# Patient Record
Sex: FEMALE | Race: OTHER | NOT HISPANIC OR LATINO | URBAN - METROPOLITAN AREA
[De-identification: names, ages, dates, MRNs, and addresses within clinical notes are randomized per-mention and may not be internally consistent; named-entity substitution may affect disease eponyms.]

---

## 2022-03-15 ENCOUNTER — OUTPATIENT (OUTPATIENT)
Dept: OUTPATIENT SERVICES | Facility: HOSPITAL | Age: 27
LOS: 1 days | End: 2022-03-15

## 2022-03-15 VITALS
TEMPERATURE: 99 F | DIASTOLIC BLOOD PRESSURE: 80 MMHG | OXYGEN SATURATION: 99 % | RESPIRATION RATE: 17 BRPM | HEART RATE: 84 BPM | WEIGHT: 179.9 LBS | HEIGHT: 62 IN | SYSTOLIC BLOOD PRESSURE: 112 MMHG

## 2022-03-15 DIAGNOSIS — E03.9 HYPOTHYROIDISM, UNSPECIFIED: ICD-10-CM

## 2022-03-15 DIAGNOSIS — Z98.891 HISTORY OF UTERINE SCAR FROM PREVIOUS SURGERY: ICD-10-CM

## 2022-03-15 DIAGNOSIS — O09.93 SUPERVISION OF HIGH RISK PREGNANCY, UNSPECIFIED, THIRD TRIMESTER: ICD-10-CM

## 2022-03-15 DIAGNOSIS — Z98.891 HISTORY OF UTERINE SCAR FROM PREVIOUS SURGERY: Chronic | ICD-10-CM

## 2022-03-15 LAB
ANION GAP SERPL CALC-SCNC: 15 MMOL/L — HIGH (ref 7–14)
APPEARANCE UR: CLEAR — SIGNIFICANT CHANGE UP
BILIRUB UR-MCNC: NEGATIVE — SIGNIFICANT CHANGE UP
BLD GP AB SCN SERPL QL: NEGATIVE — SIGNIFICANT CHANGE UP
BUN SERPL-MCNC: 6 MG/DL — LOW (ref 7–23)
CALCIUM SERPL-MCNC: 9.7 MG/DL — SIGNIFICANT CHANGE UP (ref 8.4–10.5)
CHLORIDE SERPL-SCNC: 100 MMOL/L — SIGNIFICANT CHANGE UP (ref 98–107)
CO2 SERPL-SCNC: 20 MMOL/L — LOW (ref 22–31)
COLOR SPEC: COLORLESS — SIGNIFICANT CHANGE UP
CREAT SERPL-MCNC: 0.51 MG/DL — SIGNIFICANT CHANGE UP (ref 0.5–1.3)
DIFF PNL FLD: NEGATIVE — SIGNIFICANT CHANGE UP
EGFR: 130 ML/MIN/1.73M2 — SIGNIFICANT CHANGE UP
GLUCOSE SERPL-MCNC: 61 MG/DL — LOW (ref 70–99)
GLUCOSE UR QL: NEGATIVE — SIGNIFICANT CHANGE UP
HCT VFR BLD CALC: 36.4 % — SIGNIFICANT CHANGE UP (ref 34.5–45)
HGB BLD-MCNC: 11.5 G/DL — SIGNIFICANT CHANGE UP (ref 11.5–15.5)
KETONES UR-MCNC: NEGATIVE — SIGNIFICANT CHANGE UP
LEUKOCYTE ESTERASE UR-ACNC: NEGATIVE — SIGNIFICANT CHANGE UP
MCHC RBC-ENTMCNC: 23.1 PG — LOW (ref 27–34)
MCHC RBC-ENTMCNC: 31.6 GM/DL — LOW (ref 32–36)
MCV RBC AUTO: 73.2 FL — LOW (ref 80–100)
NITRITE UR-MCNC: NEGATIVE — SIGNIFICANT CHANGE UP
NRBC # BLD: 0 /100 WBCS — SIGNIFICANT CHANGE UP
NRBC # FLD: 0 K/UL — SIGNIFICANT CHANGE UP
PH UR: 7 — SIGNIFICANT CHANGE UP (ref 5–8)
PLATELET # BLD AUTO: 176 K/UL — SIGNIFICANT CHANGE UP (ref 150–400)
POTASSIUM SERPL-MCNC: 3.8 MMOL/L — SIGNIFICANT CHANGE UP (ref 3.5–5.3)
POTASSIUM SERPL-SCNC: 3.8 MMOL/L — SIGNIFICANT CHANGE UP (ref 3.5–5.3)
PROT UR-MCNC: NEGATIVE — SIGNIFICANT CHANGE UP
RBC # BLD: 4.97 M/UL — SIGNIFICANT CHANGE UP (ref 3.8–5.2)
RBC # FLD: 16.5 % — HIGH (ref 10.3–14.5)
RH IG SCN BLD-IMP: POSITIVE — SIGNIFICANT CHANGE UP
SODIUM SERPL-SCNC: 135 MMOL/L — SIGNIFICANT CHANGE UP (ref 135–145)
SP GR SPEC: 1.01 — SIGNIFICANT CHANGE UP (ref 1–1.05)
UROBILINOGEN FLD QL: SIGNIFICANT CHANGE UP
WBC # BLD: 11.4 K/UL — HIGH (ref 3.8–10.5)
WBC # FLD AUTO: 11.4 K/UL — HIGH (ref 3.8–10.5)

## 2022-03-15 NOTE — OB PST NOTE - NSHPPHYSICALEXAM_GEN_ALL_CORE
pst Constitutional: Well Developed, Well Groomed, Well Nourished, No Distress    Eyes: PERRL, EOMI, conjunctiva clear    Ears: Normal    Mouth & Gums: Normal, moist    Pharynx: No tenderness, discharge, or peritonsillar abscess    Tonsils: No Redness, discharge, tenderness, or swelling    Neck: Supple, no JVD, normal thyroid glands, no carotid bruits, no cervical vertebral or paraspinal tenderness    Breast: Normal shape, no masses, no tenderness, nipples normal, no nipple discharge    Back: Normal shape, ROM intact, strength intact, no vertebral tenderness    Respiratory: Airway patent, breath sounds equal, good air movement, respiration non-labored, clear to auscultation bilateral, no chest wall tenderness, no intercostal retractions, no rales, no wheezes, no rhonchi, no subcutaneous emphysema    Cardiovascular:  Regular rate and rhythm, no rubs or murmur, normal PMI    Gastrointestinal: Soft, non-tender, non distention, no masses palpable, bowel sound normal, no bruit, no rebound tenderness    Extremities: No clubbing, cyanosis, or pedal edema    Vascular:  Carotid Pulse normal , Radial Pulse normal, Femoral Pulse normal, DP pulse normal, PT pulse normal    Neurological: alert & oriented x 3, sensation intact, deep reflexes intact, cranial nerve intact, normal strength    Skin: warm and dry, normal color    Lymph Nodes: normal posterior cervical lymph node, normal anterior cervical lymph node, normal supraclavicular lymph node, normal axillary lymph node, normal inguinal lymph node, normal femoral lymph node    Musculoskeletal: ROM intact, no joint swelling, warmth, or calf tenderness. Normal strength    Psychiatric: normal affect, normal behavior

## 2022-03-15 NOTE — OB PST NOTE - NSHPREVIEWOFSYSTEMS_GEN_ALL_CORE
Statement Selected
General: No fever, chills, sweating, anorexia, weight loss or weight gain. No polyphagia, polyurea, polydypsia, malaise, or fatigue    Skin: No rashes, itching, or dryness. No change in size/color of moles. No tumors, brittle nails, pitted nails, or hair loss    Breast: No tenderness, lumps, or nipple discharge      Ophthalmologic: No diplopia, photophobia, lacrimation, blurred Vision , or eye discharge    ENMT Symptoms: No hearing difficulty, ear pain, tinnitus, or vertigo. No sinus symptoms, nasal congestion, nasal   discharge, or nasal obstruction    Respiratory and Thorax: No wheezing, dyspnea, cough, hemoptysis, or pleuritic chest pPain     Cardiovascular: No chest pain, palpitations, dyspnea on exertion, orthopnea, paroxysmal nocturnal dyspnea,   peripheral edema, or claudication    Gastrointestinal: No nausea, vomiting, diarrhea, constipation, change in bowel habits, flatulence, abdominal pain, or melena    Genitourinary/ Pelvis: No hematuria, renal colic, or flank pain.  No urine discoloration, incontinence, dysuria, or urinary hesitancy. Normal urinary frequency. No nocturia, abnormal vaginal bleeding, vaginal discharge, spotting, pelvic pain, or vaginal leakage    Musculoskeletal: No arthralgia, arthritis, joint swelling, muscle cramping, muscle weakness, neck pain, arm pain, or leg pain    Neurological: No transient paralysis, weakness, paresthesias, or seizures. No syncope, tremors, vertigo, loss of sensation, difficulty walking, loss of consciousness, hemiparesis, confusion, or facial palsy    Psychiatric: No suicidal ideation, depression, anxiety, insomnia, memory loss, paranoia, mood swings, agitation, hallucinations, or hyperactivity    Hematology: No gum bleeding, nose bleeding, or skin lumps    Lymphatic: No enlarged or tender lymph nodes. No extremity swelling    Endocrine: No heat or cold intolerance    Immunologic: No recurrent or persistent infections

## 2022-03-15 NOTE — OB PST NOTE - FALL HARM RISK - UNIVERSAL INTERVENTIONS
Bed in lowest position, wheels locked, appropriate side rails in place/Call bell, personal items and telephone in reach/Instruct patient to call for assistance before getting out of bed or chair/Non-slip footwear when patient is out of bed/[place] to call system/Physically safe environment - no spills, clutter or unnecessary equipment/Purposeful Proactive Rounding/Room/bathroom lighting operational, light cord in reach

## 2022-03-15 NOTE — OB PST NOTE - HISTORY OF PRESENT ILLNESS
year old pregnant female presents to presurgical testing scheduled for   section. Patient denies vaginal  bleeding, spotting or leakage of amniotic fluid. Patient denies regular contractions. Patient reports positive fetal movement.   27 year old pregnant female presents to presurgical testing scheduled for   section. Patient denies vaginal  bleeding, spotting or leakage of amniotic fluid. Patient denies regular contractions. Patient reports positive fetal movement.     Pt with PMH of Hypothyroidism  27 year old pregnant female presents to presurgical testing scheduled for repeat  section. Patient denies vaginal  bleeding, spotting or leakage of amniotic fluid. Patient denies regular contractions. Patient reports positive fetal movement.     Pt with PMH of Hypothyroidism

## 2022-03-15 NOTE — OB PST NOTE - PROBLEM SELECTOR PLAN 1
Pt is tentatively scheduled for repeat  section on22.    Pre-op instructions provided. Pt given verbal and written instructions with teach back on chlorhexidine wash . Pt verbalized understanding with return demonstration.    Pt strongly advised to follow up with surgeon to discuss COVID testing requirements prior to procedure.

## 2022-03-27 ENCOUNTER — TRANSCRIPTION ENCOUNTER (OUTPATIENT)
Age: 27
End: 2022-03-27

## 2022-03-28 ENCOUNTER — INPATIENT (INPATIENT)
Facility: HOSPITAL | Age: 27
LOS: 2 days | Discharge: HOME | End: 2022-03-31
Attending: SPECIALIST | Admitting: SPECIALIST
Payer: MEDICAID

## 2022-03-28 ENCOUNTER — EMERGENCY (EMERGENCY)
Facility: HOSPITAL | Age: 27
LOS: 1 days | Discharge: STILL ADMITTED OR ADMINISTRATIVE | End: 2022-03-28
Admitting: EMERGENCY MEDICINE
Payer: MEDICAID

## 2022-03-28 VITALS
HEIGHT: 62 IN | DIASTOLIC BLOOD PRESSURE: 71 MMHG | OXYGEN SATURATION: 100 % | SYSTOLIC BLOOD PRESSURE: 116 MMHG | RESPIRATION RATE: 18 BRPM | TEMPERATURE: 98 F | HEART RATE: 103 BPM

## 2022-03-28 VITALS
SYSTOLIC BLOOD PRESSURE: 125 MMHG | HEART RATE: 83 BPM | RESPIRATION RATE: 18 BRPM | DIASTOLIC BLOOD PRESSURE: 73 MMHG | TEMPERATURE: 98 F

## 2022-03-28 DIAGNOSIS — Z98.891 HISTORY OF UTERINE SCAR FROM PREVIOUS SURGERY: Chronic | ICD-10-CM

## 2022-03-28 DIAGNOSIS — O26.899 OTHER SPECIFIED PREGNANCY RELATED CONDITIONS, UNSPECIFIED TRIMESTER: ICD-10-CM

## 2022-03-28 DIAGNOSIS — Z3A.00 WEEKS OF GESTATION OF PREGNANCY NOT SPECIFIED: ICD-10-CM

## 2022-03-28 LAB
ANISOCYTOSIS BLD QL: SLIGHT — SIGNIFICANT CHANGE UP
BASOPHILS # BLD AUTO: 0 K/UL — SIGNIFICANT CHANGE UP (ref 0–0.2)
BASOPHILS NFR BLD AUTO: 0 % — SIGNIFICANT CHANGE UP (ref 0–2)
COVID-19 SPIKE DOMAIN AB INTERP: POSITIVE
COVID-19 SPIKE DOMAIN ANTIBODY RESULT: >250 U/ML — HIGH
EOSINOPHIL # BLD AUTO: 0 K/UL — SIGNIFICANT CHANGE UP (ref 0–0.5)
EOSINOPHIL NFR BLD AUTO: 0 % — SIGNIFICANT CHANGE UP (ref 0–6)
GIANT PLATELETS BLD QL SMEAR: PRESENT — SIGNIFICANT CHANGE UP
HCT VFR BLD CALC: 37.7 % — SIGNIFICANT CHANGE UP (ref 34.5–45)
HGB BLD-MCNC: 12 G/DL — SIGNIFICANT CHANGE UP (ref 11.5–15.5)
IANC: 8.04 K/UL — HIGH (ref 1.8–7.4)
LYMPHOCYTES # BLD AUTO: 1.34 K/UL — SIGNIFICANT CHANGE UP (ref 1–3.3)
LYMPHOCYTES # BLD AUTO: 11.5 % — LOW (ref 13–44)
MANUAL SMEAR VERIFICATION: SIGNIFICANT CHANGE UP
MCHC RBC-ENTMCNC: 23.3 PG — LOW (ref 27–34)
MCHC RBC-ENTMCNC: 31.8 GM/DL — LOW (ref 32–36)
MCV RBC AUTO: 73.1 FL — LOW (ref 80–100)
MICROCYTES BLD QL: SLIGHT — SIGNIFICANT CHANGE UP
MONOCYTES # BLD AUTO: 0.93 K/UL — HIGH (ref 0–0.9)
MONOCYTES NFR BLD AUTO: 8 % — SIGNIFICANT CHANGE UP (ref 2–14)
MYELOCYTES NFR BLD: 3.5 % — HIGH (ref 0–0)
NEUTROPHILS # BLD AUTO: 8.76 K/UL — HIGH (ref 1.8–7.4)
NEUTROPHILS NFR BLD AUTO: 72.6 % — SIGNIFICANT CHANGE UP (ref 43–77)
NEUTS BAND # BLD: 2.6 % — SIGNIFICANT CHANGE UP (ref 0–6)
OVALOCYTES BLD QL SMEAR: SLIGHT — SIGNIFICANT CHANGE UP
PLAT MORPH BLD: ABNORMAL
PLATELET # BLD AUTO: 190 K/UL — SIGNIFICANT CHANGE UP (ref 150–400)
PLATELET COUNT - ESTIMATE: NORMAL — SIGNIFICANT CHANGE UP
POIKILOCYTOSIS BLD QL AUTO: SLIGHT — SIGNIFICANT CHANGE UP
POLYCHROMASIA BLD QL SMEAR: SLIGHT — SIGNIFICANT CHANGE UP
RBC # BLD: 5.16 M/UL — SIGNIFICANT CHANGE UP (ref 3.8–5.2)
RBC # FLD: 16.3 % — HIGH (ref 10.3–14.5)
RBC BLD AUTO: ABNORMAL
RH IG SCN BLD-IMP: POSITIVE — SIGNIFICANT CHANGE UP
SARS-COV-2 IGG+IGM SERPL QL IA: >250 U/ML — HIGH
SARS-COV-2 IGG+IGM SERPL QL IA: POSITIVE
SARS-COV-2 RNA SPEC QL NAA+PROBE: SIGNIFICANT CHANGE UP
T PALLIDUM AB TITR SER: NEGATIVE — SIGNIFICANT CHANGE UP
VARIANT LYMPHS # BLD: 1.8 % — SIGNIFICANT CHANGE UP (ref 0–6)
WBC # BLD: 11.65 K/UL — HIGH (ref 3.8–10.5)
WBC # FLD AUTO: 11.65 K/UL — HIGH (ref 3.8–10.5)

## 2022-03-28 PROCEDURE — L9996: CPT

## 2022-03-28 PROCEDURE — 88307 TISSUE EXAM BY PATHOLOGIST: CPT | Mod: 26

## 2022-03-28 DEVICE — SURGICEL FIBRILLAR 1 X 2": Type: IMPLANTABLE DEVICE | Status: FUNCTIONAL

## 2022-03-28 DEVICE — SURGICEL SNOW 2 X 4": Type: IMPLANTABLE DEVICE | Status: FUNCTIONAL

## 2022-03-28 RX ORDER — HEPARIN SODIUM 5000 [USP'U]/ML
5000 INJECTION INTRAVENOUS; SUBCUTANEOUS EVERY 12 HOURS
Refills: 0 | Status: DISCONTINUED | OUTPATIENT
Start: 2022-03-28 | End: 2022-03-31

## 2022-03-28 RX ORDER — ACETAMINOPHEN 500 MG
1000 TABLET ORAL ONCE
Refills: 0 | Status: COMPLETED | OUTPATIENT
Start: 2022-03-28 | End: 2022-03-28

## 2022-03-28 RX ORDER — SODIUM CHLORIDE 9 MG/ML
1000 INJECTION, SOLUTION INTRAVENOUS
Refills: 0 | Status: DISCONTINUED | OUTPATIENT
Start: 2022-03-28 | End: 2022-03-29

## 2022-03-28 RX ORDER — SODIUM CHLORIDE 9 MG/ML
1000 INJECTION, SOLUTION INTRAVENOUS ONCE
Refills: 0 | Status: DISCONTINUED | OUTPATIENT
Start: 2022-03-28 | End: 2022-03-28

## 2022-03-28 RX ORDER — KETOROLAC TROMETHAMINE 30 MG/ML
30 SYRINGE (ML) INJECTION EVERY 6 HOURS
Refills: 0 | Status: DISCONTINUED | OUTPATIENT
Start: 2022-03-28 | End: 2022-03-29

## 2022-03-28 RX ORDER — DEXAMETHASONE 0.5 MG/5ML
4 ELIXIR ORAL EVERY 6 HOURS
Refills: 0 | Status: DISCONTINUED | OUTPATIENT
Start: 2022-03-28 | End: 2022-03-29

## 2022-03-28 RX ORDER — OXYCODONE HYDROCHLORIDE 5 MG/1
5 TABLET ORAL
Refills: 0 | Status: DISCONTINUED | OUTPATIENT
Start: 2022-03-28 | End: 2022-03-31

## 2022-03-28 RX ORDER — LANOLIN
1 OINTMENT (GRAM) TOPICAL EVERY 6 HOURS
Refills: 0 | Status: DISCONTINUED | OUTPATIENT
Start: 2022-03-28 | End: 2022-03-31

## 2022-03-28 RX ORDER — MAGNESIUM HYDROXIDE 400 MG/1
30 TABLET, CHEWABLE ORAL
Refills: 0 | Status: DISCONTINUED | OUTPATIENT
Start: 2022-03-28 | End: 2022-03-31

## 2022-03-28 RX ORDER — OXYTOCIN 10 UNIT/ML
333.33 VIAL (ML) INJECTION
Qty: 20 | Refills: 0 | Status: DISCONTINUED | OUTPATIENT
Start: 2022-03-28 | End: 2022-03-29

## 2022-03-28 RX ORDER — OXYCODONE HYDROCHLORIDE 5 MG/1
5 TABLET ORAL ONCE
Refills: 0 | Status: DISCONTINUED | OUTPATIENT
Start: 2022-03-28 | End: 2022-03-31

## 2022-03-28 RX ORDER — SODIUM CHLORIDE 9 MG/ML
500 INJECTION INTRAMUSCULAR; INTRAVENOUS; SUBCUTANEOUS ONCE
Refills: 0 | Status: COMPLETED | OUTPATIENT
Start: 2022-03-28 | End: 2022-03-28

## 2022-03-28 RX ORDER — OXYTOCIN 10 UNIT/ML
41.67 VIAL (ML) INJECTION
Qty: 20 | Refills: 0 | Status: DISCONTINUED | OUTPATIENT
Start: 2022-03-28 | End: 2022-03-28

## 2022-03-28 RX ORDER — AMPICILLIN TRIHYDRATE 250 MG
1 CAPSULE ORAL EVERY 4 HOURS
Refills: 0 | Status: DISCONTINUED | OUTPATIENT
Start: 2022-03-28 | End: 2022-03-28

## 2022-03-28 RX ORDER — ACETAMINOPHEN 500 MG
975 TABLET ORAL
Refills: 0 | Status: DISCONTINUED | OUTPATIENT
Start: 2022-03-28 | End: 2022-03-31

## 2022-03-28 RX ORDER — SODIUM CHLORIDE 9 MG/ML
1000 INJECTION, SOLUTION INTRAVENOUS
Refills: 0 | Status: DISCONTINUED | OUTPATIENT
Start: 2022-03-28 | End: 2022-03-28

## 2022-03-28 RX ORDER — DIPHENHYDRAMINE HCL 50 MG
25 CAPSULE ORAL EVERY 6 HOURS
Refills: 0 | Status: DISCONTINUED | OUTPATIENT
Start: 2022-03-28 | End: 2022-03-31

## 2022-03-28 RX ORDER — AMPICILLIN TRIHYDRATE 250 MG
2 CAPSULE ORAL ONCE
Refills: 0 | Status: COMPLETED | OUTPATIENT
Start: 2022-03-28 | End: 2022-03-28

## 2022-03-28 RX ORDER — FAMOTIDINE 10 MG/ML
20 INJECTION INTRAVENOUS ONCE
Refills: 0 | Status: DISCONTINUED | OUTPATIENT
Start: 2022-03-28 | End: 2022-03-28

## 2022-03-28 RX ORDER — ONDANSETRON 8 MG/1
4 TABLET, FILM COATED ORAL EVERY 6 HOURS
Refills: 0 | Status: DISCONTINUED | OUTPATIENT
Start: 2022-03-28 | End: 2022-03-29

## 2022-03-28 RX ORDER — CITRIC ACID/SODIUM CITRATE 300-500 MG
30 SOLUTION, ORAL ORAL ONCE
Refills: 0 | Status: DISCONTINUED | OUTPATIENT
Start: 2022-03-28 | End: 2022-03-28

## 2022-03-28 RX ORDER — IBUPROFEN 200 MG
600 TABLET ORAL EVERY 6 HOURS
Refills: 0 | Status: COMPLETED | OUTPATIENT
Start: 2022-03-28 | End: 2023-02-24

## 2022-03-28 RX ORDER — NALOXONE HYDROCHLORIDE 4 MG/.1ML
0.1 SPRAY NASAL
Refills: 0 | Status: DISCONTINUED | OUTPATIENT
Start: 2022-03-28 | End: 2022-03-29

## 2022-03-28 RX ORDER — OXYTOCIN 10 UNIT/ML
1 VIAL (ML) INJECTION
Qty: 30 | Refills: 0 | Status: DISCONTINUED | OUTPATIENT
Start: 2022-03-28 | End: 2022-03-28

## 2022-03-28 RX ORDER — SODIUM CHLORIDE 9 MG/ML
1000 INJECTION INTRAMUSCULAR; INTRAVENOUS; SUBCUTANEOUS
Refills: 0 | Status: DISCONTINUED | OUTPATIENT
Start: 2022-03-28 | End: 2022-03-28

## 2022-03-28 RX ORDER — SIMETHICONE 80 MG/1
80 TABLET, CHEWABLE ORAL EVERY 4 HOURS
Refills: 0 | Status: DISCONTINUED | OUTPATIENT
Start: 2022-03-28 | End: 2022-03-31

## 2022-03-28 RX ORDER — TETANUS TOXOID, REDUCED DIPHTHERIA TOXOID AND ACELLULAR PERTUSSIS VACCINE, ADSORBED 5; 2.5; 8; 8; 2.5 [IU]/.5ML; [IU]/.5ML; UG/.5ML; UG/.5ML; UG/.5ML
0.5 SUSPENSION INTRAMUSCULAR ONCE
Refills: 0 | Status: DISCONTINUED | OUTPATIENT
Start: 2022-03-28 | End: 2022-03-31

## 2022-03-28 RX ORDER — SODIUM CHLORIDE 9 MG/ML
500 INJECTION, SOLUTION INTRAVENOUS ONCE
Refills: 0 | Status: COMPLETED | OUTPATIENT
Start: 2022-03-28 | End: 2022-03-28

## 2022-03-28 RX ADMIN — SODIUM CHLORIDE 1000 MILLILITER(S): 9 INJECTION INTRAMUSCULAR; INTRAVENOUS; SUBCUTANEOUS at 15:00

## 2022-03-28 RX ADMIN — SODIUM CHLORIDE 75 MILLILITER(S): 9 INJECTION, SOLUTION INTRAVENOUS at 19:04

## 2022-03-28 RX ADMIN — Medication 1 MILLIUNIT(S)/MIN: at 14:24

## 2022-03-28 RX ADMIN — SODIUM CHLORIDE 125 MILLILITER(S): 9 INJECTION, SOLUTION INTRAVENOUS at 11:21

## 2022-03-28 RX ADMIN — Medication 975 MILLIGRAM(S): at 23:15

## 2022-03-28 RX ADMIN — HEPARIN SODIUM 5000 UNIT(S): 5000 INJECTION INTRAVENOUS; SUBCUTANEOUS at 23:31

## 2022-03-28 RX ADMIN — Medication 216 GRAM(S): at 10:44

## 2022-03-28 RX ADMIN — SODIUM CHLORIDE 1000 MILLILITER(S): 9 INJECTION, SOLUTION INTRAVENOUS at 15:00

## 2022-03-28 RX ADMIN — ONDANSETRON 4 MILLIGRAM(S): 8 TABLET, FILM COATED ORAL at 23:15

## 2022-03-28 RX ADMIN — Medication 975 MILLIGRAM(S): at 22:31

## 2022-03-28 RX ADMIN — Medication 400 MILLIGRAM(S): at 18:00

## 2022-03-28 RX ADMIN — Medication 125 MILLIUNIT(S)/MIN: at 19:04

## 2022-03-28 NOTE — OB RN DELIVERY SUMMARY - NSSELHIDDEN_OBGYN_ALL_OB_FT
[NS_DeliveryAttending1_OBGYN_ALL_OB_FT:[id number]],[NS_DeliveryAssist1_OBGYN_ALL_OB_FT:[id number]],[NS_DeliveryRN_OBGYN_ALL_OB_FT:[id number]]

## 2022-03-28 NOTE — OB RN DELIVERY SUMMARY - NS_LABORCHARACTER_OBGYN_ALL_OB
Induction of labor-AROM/Augmentation of labor/Internal electronic FM/External electronic FM/Antibiotics in labor/Fetal intolerance

## 2022-03-28 NOTE — OB PROVIDER LABOR PROGRESS NOTE - ASSESSMENT
Pt tolerated exam well.    - AROM@3p 3/28  - Cont pitocin    Amyeo Afroz Jereen, PGY-1  d/w Dr Araujo

## 2022-03-28 NOTE — OB PROVIDER TRIAGE NOTE - NSHPPHYSICALEXAM_GEN_ALL_CORE
abdomen: soft, nt on palp  SVE: 3/80/-3  TAS; vertex  T(C): 36.7 (03-28-22 @ 09:09), Max: 36.8 (03-28-22 @ 08:50)  HR: 83 (03-28-22 @ 09:11) (83 - 103)  BP: 125/73 (03-28-22 @ 09:11) (116/71 - 125/73)  RR: 18 (03-28-22 @ 08:50) (18 - 18)  SpO2: 100% (03-28-22 @ 08:38) (100% - 100%) abdomen: soft, nt on palp  SVE: 3/80/-3  TAS; vertex  T(C): 36.7 (03-28-22 @ 09:09), Max: 36.8 (03-28-22 @ 08:50)  HR: 83 (03-28-22 @ 09:11) (83 - 103)  BP: 125/73 (03-28-22 @ 09:11) (116/71 - 125/73)  RR: 18 (03-28-22 @ 08:50) (18 - 18)  SpO2: 100% (03-28-22 @ 08:38) (100% - 100%)    PST 3/15/2022  CBC:  11.40< 11.5/36.4>176

## 2022-03-28 NOTE — OB PROVIDER DELIVERY SUMMARY - NSSELHIDDEN_OBGYN_ALL_OB_FT
[NS_DeliveryAttending1_OBGYN_ALL_OB_FT:[id number]],[NS_DeliveryAssist1_OBGYN_ALL_OB_FT:[id number]=]

## 2022-03-28 NOTE — OB RN TRIAGE NOTE - ABORTIONS, OB PROFILE
Chief Complaint   Patient presents with   • Follow-up     Cardiac management . Has done telehealth visit with PCP but will see him in person next week and he will probably do labs then.   Has no current labs. Has no cardiac complaints today.   • Med Refill     Needs refills on Diazide 90 day supply to [facility].       Subjective       Paige Gibsb is a 64 y.o. female with HTN, hyperlipidemia, DM, and IHD diagnosed in 2011 when she underwent stenting of the LAD and then in 2012 underwent stenting of the ramus. Cardiac work up in 2014 and again in 2018 showed no ischemia and normal LV function. She follows with Dr. Ram in [place] for diabetic management.  Effient was stopped in 2019 secondary to anemia which has improved. Labs 12/2019 H/H 13.3/40.8, A1C 6%, HDL 65, LDL 45, Tri 141.     She returns today for regular follow up. Denies cardiac symptoms. No cp, sob, dizziness, palpitations, fatigue. Continues iron therapy for anemia. No obvious bleeding noted. She continues to work full time. She tries to follow heart healthy diet. Plans to see Dr. Fox next week for fu/labs. Refills requested.          Cardiac History:    Past Surgical History:   Procedure Laterality Date   • CARDIAC CATHETERIZATION  08/12/2011    75% LAD- 2.75x18 Promus.   • CARDIAC CATHETERIZATION  05/25/2012    90% Ramus- 2.25x18 Promus   • CARDIOVASCULAR STRESS TEST  07/18/2011    3 min, 30 sec. 93% THR. bp-164/72. R/O anteroseptal ischemia   • CARDIOVASCULAR STRESS TEST  05/14/2012    4 min, 91% THR, 100/54, septal ischemi   • CARDIOVASCULAR STRESS TEST  12/14/2012    4 min, 90% THR>BP-150/70. Negative   • CARDIOVASCULAR STRESS TEST  08/26/2014    6 min, 94% THR, 132/64, negative for ischemia   • CARDIOVASCULAR STRESS TEST  01/10/2018    L.Cardiolite- EF > 65%. R/O Breast attenuation   • CARDIOVASCULAR STRESS TEST  01/10/2018    Lexiscan- no definite ischemia noted   • CONVERTED (HISTORICAL) HOLTER  12/05/2012    AVG HR 76 BPM. Rare  PVC's   • ECHO - CONVERTED  08/02/2011    >60%   • ECHO - CONVERTED  05/14/2012    EF 65-70%   • ECHO - CONVERTED  08/26/2014    EF 65%, RVSP 26 mmHg   • ECHO - CONVERTED  01/10/2018    EF 65%. RVSP- 30 mmHg   • ECHO - CONVERTED  01/10/2018    EF 60-65%, no AS, mild MR, RVSP 30 mmHg   • ECHO - CONVERTED  01/10/2018    EF 60-65%, no AS, mild MR, RVSP 30 mmHg       Current Outpatient Medications   Medication Sig Dispense Refill   • aspirin 81 MG EC tablet Take 81 mg by mouth daily.     • carvedilol (COREG) 3.125 MG tablet Take 1 tablet by mouth Daily. 90 tablet 3   • Insulin Detemir (LEVEMIR FLEXPEN SC) Inject  under the skin Daily.     • iron polysaccharides (NIFEREX) 150 MG capsule Take 150 mg by mouth 2 (Two) Times a Day.     • levothyroxine (SYNTHROID, LEVOTHROID) 125 MCG tablet Take 125 mcg by mouth daily.     • Magnesium 250 MG tablet Take  by mouth Daily.     • metFORMIN (GLUCOPHAGE) 1000 MG tablet Take 1,000 mg by mouth 2 (two) times a day with meals.     • omeprazole (PriLOSEC) 20 MG capsule Take 20 mg by mouth daily.     • pravastatin (PRAVACHOL) 40 MG tablet TAKE 1 TABLET DAILY 90 tablet 3   • traZODone (DESYREL) 100 MG tablet Take 100 mg by mouth every night.     • triamterene-hydrochlorothiazide (DYAZIDE) 37.5-25 MG per capsule Take 1 capsule by mouth Every Morning. 90 capsule 2   • vitamin B-12 (CYANOCOBALAMIN) 1000 MCG tablet Take 1,000 mcg by mouth Daily.     • Vitamin D, Cholecalciferol, 1000 UNITS tablet Take  by mouth daily.       No current facility-administered medications for this visit.      Codeine, Farxiga [dapagliflozin], Invokana [canagliflozin], and Tresiba flextouch [insulin degludec]    Past Medical History:   Diagnosis Date   • Anemia 05/2019   • Diabetes mellitus (CMS/HCC)    • GERD (gastroesophageal reflux disease)    • H/O foot surgery     left and right foot   • History of cholecystectomy    • History of tonsillectomy    • Hyperlipidemia    • Hypothyroidism    • Pancreatitis    •  "Vitamin D deficiency      Social History     Socioeconomic History   • Marital status:      Spouse name: Not on file   • Number of children: Not on file   • Years of education: Not on file   • Highest education level: Not on file   Tobacco Use   • Smoking status: Former Smoker     Quit date:      Years since quittin.7   • Smokeless tobacco: Never Used   Substance and Sexual Activity   • Alcohol use: No   • Drug use: No   • Sexual activity: Defer     Family History   Problem Relation Age of Onset   • Heart attack Mother         first MI at age 28   • Heart disease Father         CABG at age 56. 1-2 stents after that     Review of Systems   Constitution: Negative for decreased appetite and malaise/fatigue.   HENT: Negative.    Eyes: Negative for blurred vision.   Cardiovascular: Negative for chest pain, dyspnea on exertion, leg swelling, palpitations and syncope.   Respiratory: Negative for shortness of breath and sleep disturbances due to breathing.    Endocrine: Negative.    Hematologic/Lymphatic: Negative for bleeding problem. Does not bruise/bleed easily.   Skin: Negative.    Musculoskeletal: Negative for falls and myalgias.   Gastrointestinal: Negative for abdominal pain, heartburn and melena.   Genitourinary: Negative for hematuria.   Neurological: Negative for dizziness and light-headedness.   Psychiatric/Behavioral: Negative for altered mental status.   Allergic/Immunologic: Negative.       Objective     /72 (BP Location: Left arm)   Pulse 74   Temp 97.7 °F (36.5 °C)   Ht 162.6 cm (64.02\")   Wt 89.2 kg (196 lb 9.6 oz)   BMI 33.73 kg/m²     Vitals signs and nursing note reviewed.   Constitutional:       General: Not in acute distress.     Appearance: Well-developed. Not diaphoretic.   Eyes:      Pupils: Pupils are equal, round, and reactive to light.   HENT:      Head: Normocephalic.   Neck:      Musculoskeletal: Normal range of motion.   Pulmonary:      Effort: Pulmonary effort is " normal. No respiratory distress.      Breath sounds: Normal breath sounds.   Cardiovascular:      Normal rate. Regular rhythm.   Pulses:     Intact distal pulses.   Abdominal:      General: Bowel sounds are normal.      Palpations: Abdomen is soft.   Musculoskeletal: Normal range of motion.   Skin:     General: Skin is warm and dry.   Neurological:      Mental Status: Alert and oriented to person, place, and time.        Procedures          Problem List Items Addressed This Visit        Cardiovascular and Mediastinum    CAD (coronary artery disease) - Primary    HTN (hypertension)    Relevant Medications    triamterene-hydrochlorothiazide (DYAZIDE) 37.5-25 MG per capsule    Hyperlipemia       Endocrine    Type 2 diabetes mellitus without complication, without long-term current use of insulin (CMS/[facility])       Hematopoietic and Hemostatic    Iron deficiency anemia    Relevant Medications    vitamin B-12 (CYANOCOBALAMIN) 1000 MCG tablet       Other    History of placement of stent in LAD coronary artery         1. CAD- s/p stenting LAD, 2011, s/p stenting ramus, 2012. Negative stress, 2014, 2018. She denies anginal symptoms. Continue aspirin, beta blocker, statin.      2. HTN- upper limit of normal but controlled, continue triamterene/HCTZ, Coreg. Limit sodium 1500 mg daily. Monitor periodically at home. If increases to >140, contact office. Refills sent for Dyazide.      3. Hyperlipidemia- lipids checked 12/2019 excellent with HDL 65/LDL 45. Continue pravastatin. Heart healthy diet.       4. Diabetes- well controlled, A1C reported as 6.0%. Continue aspirin, statin, she is not on ACE or ARB. If BP increases, will add lisinopril for bp and renovascular protection.     5. MARY BETH- improved, she remains on iron and off Effient. Plans to repeat labs next week.     Patient's Body mass index is 33.73 kg/m². BMI is above normal parameters. Recommendations include: nutrition counseling.               Electronically signed by Bhavana HUMPHREY  JULIAN Valentino,  September 24, 2020 10:41 EDT   0

## 2022-03-28 NOTE — OB RN DELIVERY SUMMARY - NS_SEPSISRSKCALC_OBGYN_ALL_OB_FT
EOS calculated successfully. EOS Risk Factor: 0.5/1000 live births ([facility] national incidence); GA=39w5d; Temp=99.32; ROM=1.3; GBS='Positive'; Antibiotics='GBS specific antibiotics > 2 hrs prior to birth'

## 2022-03-28 NOTE — OB PROVIDER H&P - HISTORY OF PRESENT ILLNESS
26 y/o  @ 39.5 wks gestation presents with c/o uterine contractions every 5-10 minutes since 2200 denies any LOF or VB reports +FM denies any n/v/d denies any fever or chills ap care uncomplicated thus far   scheduled for repeat  3/29/2022 @ 1330  PST 3/15/2022

## 2022-03-28 NOTE — OB PROVIDER H&P - ASSESSMENT
28 y/o  @ 39.5 wks gestation presents in labor for rpt  :  seen and evaluated with dr Araujo   pt for rpt     huddle @ 9022  Dr Brandy Yi charge nurse in attendance   rpt  @ 39.5 wks gest in labor   see admission orders

## 2022-03-28 NOTE — OB NEONATOLOGY/PEDIATRICIAN DELIVERY SUMMARY - NSPEDSNEONOTESA_OBGYN_ALL_OB_FT
Pediatrician called to delivery for decelerations. Male infant born at 39+5 wks via rpt  to a 29 y/o  blood type O+ mother. Maternal history of hypothyroidism on synthroid. No significant prenatal history. Prenatal labs nr/immune/-, GBS + on 3/11, treated with ampx2. ROM in OR on 3/28 with clear fluids. Baby emerged vigorous, crying.  Infant was brought to radiant warmer and warmed, dried, stimulated and suctioned. HR>100, normal respiratory effort. APGARS of . Mom is initiating breast & formula feeding. Consents to Hepatitis B vaccination. Desires for infant to be circumcised. EOS score _. Pediatrician is [place]  Baby stable for transfer to  nursery. Parents updated. Pediatrician called to delivery for decelerations. Male infant born at 39+5 wks via rpt  to a 27 y/o  blood type O+ mother. Maternal history of hypothyroidism on synthroid. No significant prenatal history. Prenatal labs nr/immune/-, GBS + on 3/11, treated with ampx2. ROM in OR on 3/28 with clear fluids. Baby emerged vigorous, crying.  Infant was brought to radiant warmer and warmed, dried, stimulated and suctioned. HR>100. Noted to have nasal flaring and decreased oxygen saturations, so CPAP given for 4 minutes. APGARS of 8/8/9 Mom is initiating breast & formula feeding. Consents to Hepatitis B vaccination. Desires for infant to be circumcised. EOS score 0.04. Pediatrician is [place]  Baby stable for transfer to  nursery. Parents updated.

## 2022-03-28 NOTE — OB RN TRIAGE NOTE - FALL HARM RISK - UNIVERSAL INTERVENTIONS
Bed in lowest position, wheels locked, appropriate side rails in place/Call bell, personal items and telephone in reach/Instruct patient to call for assistance before getting out of bed or chair/Non-slip footwear when patient is out of bed/[place] to call system/Physically safe environment - no spills, clutter or unnecessary equipment/Purposeful Proactive Rounding/Room/bathroom lighting operational, light cord in reach

## 2022-03-28 NOTE — OB PROVIDER TRIAGE NOTE - NSOBPROVIDERNOTE_OBGYN_ALL_OB_FT
26 y/o  @ 39.5 wks gestation presents in labor for rpt  :  seen and evaluated with dr Araujo   pt for rpt     huddle @     rpt  @ 39.5 wks gest in labor   see admission orders 26 y/o  @ 39.5 wks gestation presents in labor for rpt  :  seen and evaluated with dr Araujo   pt for rpt     huddle @ 6228  Dr Brandy Yi charge nurse in attendance   rpt  @ 39.5 wks gest in labor   see admission orders

## 2022-03-28 NOTE — OB RN PATIENT PROFILE - FALL HARM RISK - UNIVERSAL INTERVENTIONS
Bed in lowest position, wheels locked, appropriate side rails in place/Call bell, personal items and telephone in reach/Instruct patient to call for assistance before getting out of bed or chair/Non-slip footwear when patient is out of bed/[place] to call system/Physically safe environment - no spills, clutter or unnecessary equipment/Purposeful Proactive Rounding/Room/bathroom lighting operational, light cord in reach

## 2022-03-28 NOTE — OB RN TRIAGE NOTE - NSICDXPASTSURGICALHX_GEN_ALL_CORE_FT
06/07/18 1000   Activity/Group Therapy Checklist   Group Educational  (grief/loss )   Attendance Attended   Follows Direction Followed directions   Group Interactions/Observations Interacted appropriately   Affect/Mood Range Blunted/flat   Affect/Mood Display Appropriate      Dr. Muchard - please see my discussion regarding his dupixent. If you would like us to prescribe going forward, let us know.  [place]illes PAST SURGICAL HISTORY:  H/O  section

## 2022-03-28 NOTE — CHART NOTE - NSCHARTNOTEFT_GEN_A_CORE
Patient initially presented with plan for repeat  section. Patient expressed that she would like to having more children in the future. Discussed the increasing risk of future pregnancy after  section x2, as well as increased risks intra-op of repeat  section. Alternative to rLTCS, TOLAC, was discussed in the office by Dr Alaniz with patient. It was rediscussed in patient. Patient interested, and therefore the following conversation was had with myself and Dr Araujo by bedside to discuss vaginal birth and trial of labor after previous  section. :    She understands there is an increased risk of uterine rupture, which is the most serious complication of attempting a vaginal delivery after a  section.  She understands that uterine rupture is a potentially catastrophic event and occurs in about 1% of attempted vaginal deliveries after a  section. It has been explained to me that the risk of uterine rupture increases with the number of previous  section deliveries.  She understands that in the event her uterus ruptures, emergency surgery will be performed, but there may not be sufficient time to operate and prevent permanent injury to or death of her or her baby.   She understands that in addition to a rupture of the uterus, risks to her include, but are not limited to, hysterectomy (loss of the uterus), blood transfusion, infection, injury to internal organs (bowel, bladder, ureter), blood clots, and/or death.  She understands that a  section as a result of a failed trial of labor is associated with more complications than an elective  delivery and that those risks include increased risk of infection and operative injury.    PLAN:  Patient at this time would like to proceed with TOLAC.  TOLAC and IOL forms are signed and witness.  Patient for epidural, augmentation as needed.      Amyeo Afroz Jereen, PGY-1  d/w Dr Araujo and OB Team    Discussed in patient's native language, Bang[place]. Patient initially presented with plan for repeat  section. Patient expressed that she would like to having more children in the future. Discussed the increasing risk of future pregnancy after  section x2, as well as increased risks intra-op of repeat  section. Alternative to rLTCS, TOLAC, was discussed in the office by Dr Alaniz with patient. It was rediscussed in patient. Patient interested, and therefore the following conversation was had with myself and Dr Araujo by bedside to discuss vaginal birth and trial of labor after previous  section. :    She understands there is an increased risk of uterine rupture, which is the most serious complication of attempting a vaginal delivery after a  section. Patient understands that one of these complications is maternal and/or fetal demise.  She understands that uterine rupture is a potentially catastrophic event and occurs in about 1% of attempted vaginal deliveries after a  section. It has been explained to me that the risk of uterine rupture increases with the number of previous  section deliveries.  She understands that in the event her uterus ruptures, emergency surgery will be performed, but there may not be sufficient time to operate and prevent permanent injury to or death of her or her baby.   She understands that in addition to a rupture of the uterus, risks to her include, but are not limited to, hysterectomy (loss of the uterus), blood transfusion, infection, injury to internal organs (bowel, bladder, ureter), blood clots, and/or death.  She understands that a  section as a result of a failed trial of labor is associated with more complications than an elective  delivery and that those risks include increased risk of infection and operative injury.    PLAN:  Patient at this time would like to proceed with TOLAC.  TOLAC and IOL forms are signed and witness.  Patient for epidural, augmentation as needed.      Amyeo Afroz Jereen, PGY-1  d/w Dr Araujo and OB Team    Discussed in patient's native language, Bang[place].

## 2022-03-28 NOTE — OB RN INTRAOPERATIVE NOTE - NSSELHIDDEN_OBGYN_ALL_OB_FT
[NS_DeliveryAttending1_OBGYN_ALL_OB_FT:[id number]],[NS_DeliveryAssist1_OBGYN_ALL_OB_FT:[id number]],[NS_DeliveryRN_OBGYN_ALL_OB_FT:[id number]]

## 2022-03-28 NOTE — OB PROVIDER TRIAGE NOTE - HISTORY OF PRESENT ILLNESS
26 y/o  @ 39.5 wks gestation presents with c/o uterine contractions every 5-10 minutes since 2200 denies any LOF or VB reports +FM denies any n/v/d denies any fever or chills ap care uncomplicated thus far   scheduled for repeat  3/29/2022 @ 3958   28 y/o  @ 39.5 wks gestation presents with c/o uterine contractions every 5-10 minutes since 2200 denies any LOF or VB reports +FM denies any n/v/d denies any fever or chills ap care uncomplicated thus far   scheduled for repeat  3/29/2022 @ 1330  PST 3/15/2022

## 2022-03-28 NOTE — OB PROVIDER H&P - NSHPPHYSICALEXAM_GEN_ALL_CORE
abdomen: soft, nt on palp  SVE: 3/80/-3  TAS; vertex  T(C): 36.7 (03-28-22 @ 09:09), Max: 36.8 (03-28-22 @ 08:50)  HR: 83 (03-28-22 @ 09:11) (83 - 103)  BP: 125/73 (03-28-22 @ 09:11) (116/71 - 125/73)  RR: 18 (03-28-22 @ 08:50) (18 - 18)  SpO2: 100% (03-28-22 @ 08:38) (100% - 100%)    PST 3/15/2022  CBC:  11.40< 11.5/36.4>176

## 2022-03-28 NOTE — OB PROVIDER DELIVERY SUMMARY - NSPROVIDERDELIVERYNOTE_OBGYN_ALL_OB_FT
rLTCS 2/2 failed TOLAC and terminal bradycardia  GETA 2/2 inadequate epidural   viable male infant, vertex, tight nuchal cord, apgars 8/8  grossly normal uterus, b/l tubes and ovaries  uterine atony without hemorrhage resolved with Methergine, TXA, Hemabate, pit IM  no intraabdominal adhesions  Fibrillar and Surgicel powder placed over hysterotomy and bladder flap  605/1700/200

## 2022-03-28 NOTE — PROVIDER CONTACT NOTE (OTHER) - SITUATION
pt , s/p emergent rpt c/s, time of delivery 1558 today, c/o posterior headache since arriving in PACU at 1730.

## 2022-03-29 ENCOUNTER — TRANSCRIPTION ENCOUNTER (OUTPATIENT)
Age: 27
End: 2022-03-29

## 2022-03-29 LAB
BASOPHILS # BLD AUTO: 0.06 K/UL — SIGNIFICANT CHANGE UP (ref 0–0.2)
BASOPHILS NFR BLD AUTO: 0.3 % — SIGNIFICANT CHANGE UP (ref 0–2)
EOSINOPHIL # BLD AUTO: 0.01 K/UL — SIGNIFICANT CHANGE UP (ref 0–0.5)
EOSINOPHIL NFR BLD AUTO: 0.1 % — SIGNIFICANT CHANGE UP (ref 0–6)
HCT VFR BLD CALC: 29.1 % — LOW (ref 34.5–45)
HGB BLD-MCNC: 9.5 G/DL — LOW (ref 11.5–15.5)
IANC: 15.03 K/UL — HIGH (ref 1.8–7.4)
IMM GRANULOCYTES NFR BLD AUTO: 2 % — HIGH (ref 0–1.5)
LYMPHOCYTES # BLD AUTO: 1.05 K/UL — SIGNIFICANT CHANGE UP (ref 1–3.3)
LYMPHOCYTES # BLD AUTO: 5.9 % — LOW (ref 13–44)
MCHC RBC-ENTMCNC: 23.9 PG — LOW (ref 27–34)
MCHC RBC-ENTMCNC: 32.6 GM/DL — SIGNIFICANT CHANGE UP (ref 32–36)
MCV RBC AUTO: 73.3 FL — LOW (ref 80–100)
MONOCYTES # BLD AUTO: 1.28 K/UL — HIGH (ref 0–0.9)
MONOCYTES NFR BLD AUTO: 7.2 % — SIGNIFICANT CHANGE UP (ref 2–14)
NEUTROPHILS # BLD AUTO: 15.03 K/UL — HIGH (ref 1.8–7.4)
NEUTROPHILS NFR BLD AUTO: 84.5 % — HIGH (ref 43–77)
NRBC # BLD: 0 /100 WBCS — SIGNIFICANT CHANGE UP
NRBC # FLD: 0 K/UL — SIGNIFICANT CHANGE UP
PLATELET # BLD AUTO: 165 K/UL — SIGNIFICANT CHANGE UP (ref 150–400)
RBC # BLD: 3.97 M/UL — SIGNIFICANT CHANGE UP (ref 3.8–5.2)
RBC # FLD: 16 % — HIGH (ref 10.3–14.5)
WBC # BLD: 17.79 K/UL — HIGH (ref 3.8–10.5)
WBC # FLD AUTO: 17.79 K/UL — HIGH (ref 3.8–10.5)

## 2022-03-29 RX ORDER — FAMOTIDINE 10 MG/ML
20 INJECTION INTRAVENOUS ONCE
Refills: 0 | Status: COMPLETED | OUTPATIENT
Start: 2022-03-29 | End: 2022-03-29

## 2022-03-29 RX ORDER — METOCLOPRAMIDE HCL 10 MG
10 TABLET ORAL ONCE
Refills: 0 | Status: COMPLETED | OUTPATIENT
Start: 2022-03-29 | End: 2022-03-29

## 2022-03-29 RX ORDER — LEVOTHYROXINE SODIUM 125 MCG
125 TABLET ORAL DAILY
Refills: 0 | Status: DISCONTINUED | OUTPATIENT
Start: 2022-03-29 | End: 2022-03-31

## 2022-03-29 RX ORDER — IBUPROFEN 200 MG
1 TABLET ORAL
Qty: 0 | Refills: 0 | DISCHARGE
Start: 2022-03-29

## 2022-03-29 RX ORDER — ONDANSETRON 8 MG/1
4 TABLET, FILM COATED ORAL ONCE
Refills: 0 | Status: COMPLETED | OUTPATIENT
Start: 2022-03-29 | End: 2022-03-29

## 2022-03-29 RX ORDER — LEVOTHYROXINE SODIUM 125 MCG
1 TABLET ORAL
Qty: 0 | Refills: 0 | DISCHARGE

## 2022-03-29 RX ORDER — SODIUM CHLORIDE 9 MG/ML
1000 INJECTION, SOLUTION INTRAVENOUS
Refills: 0 | Status: DISCONTINUED | OUTPATIENT
Start: 2022-03-29 | End: 2022-03-29

## 2022-03-29 RX ORDER — IBUPROFEN 200 MG
600 TABLET ORAL EVERY 6 HOURS
Refills: 0 | Status: DISCONTINUED | OUTPATIENT
Start: 2022-03-29 | End: 2022-03-31

## 2022-03-29 RX ADMIN — HEPARIN SODIUM 5000 UNIT(S): 5000 INJECTION INTRAVENOUS; SUBCUTANEOUS at 23:32

## 2022-03-29 RX ADMIN — Medication 30 MILLIGRAM(S): at 00:36

## 2022-03-29 RX ADMIN — Medication 30 MILLIGRAM(S): at 16:13

## 2022-03-29 RX ADMIN — Medication 30 MILLIGRAM(S): at 08:21

## 2022-03-29 RX ADMIN — Medication 30 MILLIGRAM(S): at 08:04

## 2022-03-29 RX ADMIN — Medication 975 MILLIGRAM(S): at 20:26

## 2022-03-29 RX ADMIN — Medication 975 MILLIGRAM(S): at 21:22

## 2022-03-29 RX ADMIN — HEPARIN SODIUM 5000 UNIT(S): 5000 INJECTION INTRAVENOUS; SUBCUTANEOUS at 11:40

## 2022-03-29 RX ADMIN — Medication 10 MILLIGRAM(S): at 00:36

## 2022-03-29 RX ADMIN — ONDANSETRON 4 MILLIGRAM(S): 8 TABLET, FILM COATED ORAL at 08:13

## 2022-03-29 RX ADMIN — ONDANSETRON 4 MILLIGRAM(S): 8 TABLET, FILM COATED ORAL at 03:47

## 2022-03-29 RX ADMIN — Medication 975 MILLIGRAM(S): at 04:50

## 2022-03-29 RX ADMIN — Medication 4 MILLIGRAM(S): at 09:16

## 2022-03-29 RX ADMIN — Medication 600 MILLIGRAM(S): at 23:31

## 2022-03-29 RX ADMIN — Medication 30 MILLIGRAM(S): at 15:18

## 2022-03-29 RX ADMIN — FAMOTIDINE 20 MILLIGRAM(S): 10 INJECTION INTRAVENOUS at 03:48

## 2022-03-29 RX ADMIN — Medication 975 MILLIGRAM(S): at 03:47

## 2022-03-29 RX ADMIN — Medication 30 MILLIGRAM(S): at 01:15

## 2022-03-29 NOTE — PROGRESS NOTE ADULT - SUBJECTIVE AND OBJECTIVE BOX
Pain Service Follow-up  Postop Day  1    S/P  C- Section    T(C): 36.8 (03-29-22 @ 10:04), Max: 37.4 (03-28-22 @ 12:39)  HR: 87 (03-29-22 @ 10:04) (69 - 101)  BP: 108/61 (03-29-22 @ 10:04) (98/63 - 132/76)  RR: 17 (03-29-22 @ 10:04) (17 - 35)  SpO2: 100% (03-29-22 @ 10:04) (97% - 100%)  Wt(kg): --      THERAPY:     S/P Epidural Morphine    Sedation Score:	  [X] Alert	      [  ] Drowsy       [  ] Arousable	[  ] Asleep         [  ] Unresponsive    Side Effects:	  [X] None	      [  ] Nausea       [  ] Pruritus        [  ] Weakness   [  ] Numbness        ASSESSMENT/ PLAN   [ X ] Discontinue         [  ] Continue    [ X ] Documentation and Verification of current medications       Satisfactory Post Anesthetic Course

## 2022-03-29 NOTE — PROGRESS NOTE ADULT - SUBJECTIVE AND OBJECTIVE BOX
Postpartum Note,  Section  She is a  27y woman who is now post-operative day: 1    Subjective:  The patient feels well.  She is ambulating.   She is tolerating regular diet.  She denies nausea and vomiting.  She is voiding.  Her pain is controlled.  She reports normal postpartum bleeding.    Vital Signs Last 24 Hrs  T(C): 36.9 (29 Mar 2022 05:33), Max: 37.4 (28 Mar 2022 12:39)  T(F): 98.5 (29 Mar 2022 05:33), Max: 99.32 (28 Mar 2022 12:39)  HR: 75 (29 Mar 2022 05:33) (69 - 101)  BP: 104/64 (29 Mar 2022 05:33) (97/- - 132/76)  BP(mean): 78 (28 Mar 2022 20:00) (66 - 78)  RR: 20 (29 Mar 2022 05:33) (16 - 35)  SpO2: 99% (29 Mar 2022 05:33) (97% - 100%)    Physical exam:  Gen: NAD  Breast: Soft, nontender, not engorged.  Abdomen: Soft, nontender, no distension , firm uterine fundus at umbilicus.  Incision: Clean, dry, and intact with steri strips  Pelvic: Normal lochia noted  Ext: No calf tenderness    LABS:                        9.5    17.79 )-----------( 165      ( 29 Mar 2022 07:34 )             29.1     ABO Interpretation: O ( @ 09:50)  Rh Interpretation: Positive ( @ 09:50)  Antibody Screen: Negative ( @ 09:50)      Allergies    No Known Allergies    Intolerances      MEDICATIONS  (STANDING):  acetaminophen     Tablet .. 975 milliGRAM(s) Oral <User Schedule>  diphtheria/tetanus/pertussis (acellular) Vaccine (ADAcel) 0.5 milliLiter(s) IntraMuscular once  heparin   Injectable 5000 Unit(s) SubCutaneous every 12 hours  ibuprofen  Tablet. 600 milliGRAM(s) Oral every 6 hours  ketorolac   Injectable 30 milliGRAM(s) IV Push every 6 hours  lactated ringers. 1000 milliLiter(s) (75 mL/Hr) IV Continuous <Continuous>  lactated ringers. 1000 milliLiter(s) (75 mL/Hr) IV Continuous <Continuous>  oxytocin Infusion 333.333 milliUNIT(s)/Min (1000 mL/Hr) IV Continuous <Continuous>  oxytocin Infusion 333.333 milliUNIT(s)/Min (1000 mL/Hr) IV Continuous <Continuous>    MEDICATIONS  (PRN):  dexAMETHasone  Injectable 4 milliGRAM(s) IV Push every 6 hours PRN Nausea  diphenhydrAMINE 25 milliGRAM(s) Oral every 6 hours PRN Pruritus  lanolin Ointment 1 Application(s) Topical every 6 hours PRN Sore Nipples  magnesium hydroxide Suspension 30 milliLiter(s) Oral two times a day PRN Constipation  naloxone Injectable 0.1 milliGRAM(s) IV Push every 3 minutes PRN For ANY of the following changes in patient status:  A. Breaths Per Minute LESS THAN 10, B. Oxygen saturation LESS THAN 90%, C. Sedation score of 6 for Stop After: 4 Times  ondansetron Injectable 4 milliGRAM(s) IV Push every 6 hours PRN Nausea  oxyCODONE    IR 5 milliGRAM(s) Oral every 3 hours PRN Moderate to Severe Pain (4-10)  oxyCODONE    IR 5 milliGRAM(s) Oral once PRN Moderate to Severe Pain (4-10)  simethicone 80 milliGRAM(s) Chew every 4 hours PRN Gas        Assessment and Plan  POD #1  s/p  section  Doing well.  Encourage ambulation.

## 2022-03-29 NOTE — PROGRESS NOTE ADULT - SUBJECTIVE AND OBJECTIVE BOX
OB Progress Note:  Delivery, POD#1 (seen and evaluated at 0159)    S: 27y POD#1 s/p LTCS in the setting of a failed TOLAC. Pain controlled. Denies any chest pain or shortness of breath. Endorsing intermittent n/v, notably with PO intake. Does endorse feelings of room-spinning and light-headedness w/ movement of her head. Pavon in place. Patient had reported some relief with IV Zofran and IV Reglan administered earlier.    O:   Vital Signs Last 24 Hrs  T(C): 36.4 (29 Mar 2022 02:43), Max: 37.4 (28 Mar 2022 12:39)  T(F): 97.6 (29 Mar 2022 02:43), Max: 99.32 (28 Mar 2022 12:39)  HR: 70 (29 Mar 2022 02:43) (69 - 103)  BP: 116/64 (29 Mar 2022 02:43) (97/- - 132/76)  BP(mean): 78 (28 Mar 2022 20:00) (66 - 78)  RR: 31 (28 Mar 2022 20:00) (16 - 35)  SpO2: 97% (29 Mar 2022 02:43) (97% - 100%)    Labs:  Blood type: O Positive  Rubella IgG: RPR: Negative                          12.0   11.65<H> >-----------< 190    (  @ 09:57 )             37.7          PE:  Gen: Awake. Alert. Pt w/ clear spit-up at the bedside in a bucket  CV: Regular rate and rhythm. No murmurs appreciated. PCA taking vitals noted a HR of 98  Pulm: Clear to auscultation bilaterally. No wheezes, crackles, or rhonchi  Abd: Soft. Non-tender. Scant bowel sounds. Incision c/d/i w/ overlying dermabond prineo  : Pavon in place   Extremities: No calf tenderness bilaterally

## 2022-03-29 NOTE — PROGRESS NOTE ADULT - ASSESSMENT
A/P: 27y POD#1 s/p LTCS for failed TOLAC w/ post-op n/v    - Advance diet as tolerated   - Continue motrin, tylenol, oxycodone PRN for pain control.    #Post-op n/v  - Pt on 75cc/hr of LR w/ reported improvement with IV anti-emetics (Zofran and Reglan)  - Will continue to monitor response and ability to tolerate PO  - VS w/ HR in 60-70s    #Lightheadedness  - VS are reassuring, pt is not tachycardic. Will follow-up AM CBC   - UOP is adequate, 275cc over past 2 hours    Jaspal Rogel, PGY-1  Ob/Gyn

## 2022-03-29 NOTE — PROVIDER CONTACT NOTE (OTHER) - BACKGROUND
3/28/22 @ 15:58 numerous dizzy spells and continuous vomiting
 patient delivered on 3/238/2022 @ 15:58, TOLAC but ended up been sectioned. AROM @ 14:40 clear.
Pt with c/o nausea, vomiting, and dizziness throughout the night. Pavon catheter was removed at 0645. DTV x2.
pt has been given Ofirmev at 1800, received toradol from anesthesia in OR . endorses partial improvement, but states headache is still present. BP WNL

## 2022-03-29 NOTE — DISCHARGE NOTE OB - MEDICATION SUMMARY - MEDICATIONS TO TAKE
I will START or STAY ON the medications listed below when I get home from the hospital:    ibuprofen 600 mg oral tablet  -- 1 tab(s) by mouth every 6 hours  -- Indication: For Post op

## 2022-03-29 NOTE — DISCHARGE NOTE OB - CARE PROVIDER_API CALL
Ina Alaniz  OBSTETRICS AND GYNECOLOGY  [address], [address]  [place] [address]  Phone: [phone]  Fax: [phone]  Follow Up Time:

## 2022-03-29 NOTE — DISCHARGE NOTE OB - CARE PLAN
1 Principal Discharge DX:	Single delivery by  section  Assessment and plan of treatment:	follow up 2 weeks/ regular diet & activity as tolerate

## 2022-03-29 NOTE — PROVIDER CONTACT NOTE (OTHER) - REASON
Pt. very dizzy with numerous vomiting episodes
Patient stated she's feeling dizzy.
Pt status.
pt c/o headache. MD aware of RR on cardiac monitor

## 2022-03-29 NOTE — DISCHARGE NOTE OB - NS MD DC FALL RISK RISK
For information on Fall & Injury Prevention, visit: [email or web address] OR  [email or web address] OR  https://www.cdc.gov/steadi/patient.html

## 2022-03-29 NOTE — PROVIDER CONTACT NOTE (OTHER) - ASSESSMENT
Patient has stable vital signs with Pit running
Pt was given Toradol  at 0804 for pain control. Zofran given at 0813 and Decadron at 0916 for c/o nausea. Pt was advised to remain NPO but reported having water, ice chips, and orange juice with vomiting soon after drinking. Abdomen soft, +BS, no flatus yet. Pt instructed to call for assistance for voiding.
pt aox4, RR on cardiac monitor 30. pt breathing appears even, non labored. pt denies chest pain, sob. pt able to speak clearly. pt denies vision changes, n/v.

## 2022-03-29 NOTE — DISCHARGE NOTE OB - PATIENT PORTAL LINK FT
You can access the FollowMyHealth Patient Portal offered by [facility] by registering at the following website: [email or web address]. By joining [facility]’s FollowMyHealth portal, you will also be able to view your health information using other applications (apps) compatible with our system.

## 2022-03-29 NOTE — PROVIDER CONTACT NOTE (OTHER) - ACTION/TREATMENT ORDERED:
resident will assess pt, medication/treatment will be decidd upon assessment
MD. Rinku Rogel came and assess pt, advised to leave Pavon in place, wait for further instructions and orders because patient was still vomiting. MD. ordered Famotidine, Zofran, Pepcid, Reglan.
LR@125cc/hr. NPO.
MD. Puga stated no actions at this time but to monitor patient. If changes occur, please call again to assess patient.

## 2022-03-30 RX ORDER — SENNA PLUS 8.6 MG/1
1 TABLET ORAL
Refills: 0 | Status: DISCONTINUED | OUTPATIENT
Start: 2022-03-30 | End: 2022-03-31

## 2022-03-30 RX ADMIN — Medication 600 MILLIGRAM(S): at 05:11

## 2022-03-30 RX ADMIN — Medication 975 MILLIGRAM(S): at 09:45

## 2022-03-30 RX ADMIN — SENNA PLUS 1 TABLET(S): 8.6 TABLET ORAL at 21:35

## 2022-03-30 RX ADMIN — Medication 975 MILLIGRAM(S): at 21:35

## 2022-03-30 RX ADMIN — Medication 975 MILLIGRAM(S): at 08:59

## 2022-03-30 RX ADMIN — Medication 600 MILLIGRAM(S): at 13:33

## 2022-03-30 RX ADMIN — Medication 975 MILLIGRAM(S): at 16:00

## 2022-03-30 RX ADMIN — Medication 600 MILLIGRAM(S): at 18:33

## 2022-03-30 RX ADMIN — Medication 600 MILLIGRAM(S): at 00:30

## 2022-03-30 RX ADMIN — Medication 600 MILLIGRAM(S): at 14:15

## 2022-03-30 RX ADMIN — Medication 600 MILLIGRAM(S): at 06:00

## 2022-03-30 RX ADMIN — SIMETHICONE 80 MILLIGRAM(S): 80 TABLET, CHEWABLE ORAL at 21:35

## 2022-03-30 RX ADMIN — Medication 975 MILLIGRAM(S): at 22:40

## 2022-03-30 RX ADMIN — Medication 125 MICROGRAM(S): at 05:11

## 2022-03-30 RX ADMIN — HEPARIN SODIUM 5000 UNIT(S): 5000 INJECTION INTRAVENOUS; SUBCUTANEOUS at 10:57

## 2022-03-30 RX ADMIN — SIMETHICONE 80 MILLIGRAM(S): 80 TABLET, CHEWABLE ORAL at 15:19

## 2022-03-30 RX ADMIN — SIMETHICONE 80 MILLIGRAM(S): 80 TABLET, CHEWABLE ORAL at 10:56

## 2022-03-30 RX ADMIN — Medication 975 MILLIGRAM(S): at 15:19

## 2022-03-30 NOTE — PROGRESS NOTE ADULT - SUBJECTIVE AND OBJECTIVE BOX
This patient was breastfeeding her baby during rounds this morning.  I was unable to fully assess.   VSS, Afebrile  Will check back later.  Continue routine Post Op Care.

## 2022-03-30 NOTE — PROGRESS NOTE ADULT - SUBJECTIVE AND OBJECTIVE BOX
SUBJECTIVE:    Pain: Controlled    Complaints: C/O gas pains    MILESTONES:    Alert and Oriented x 3  [ x ]  Out of bed/ ambulating. [ x ]  Flatus:   Positive [ x ]  Negative [  ]  Bowel movement  [  ] Positive [  ] Negative   Voiding [x  ] Due to void [  ]   Pavon/Indwelling catheter in place [  ]  Diet: Regular [ x ]  Clears [  ]  NPO [  ]    Infant feeding:  Breast [  ]   Bottle [  ]  Both [ X ]  Feeding related issues and/or concerns:      OBJECTIVE:  T(C): 36.8 (22 @ 05:58), Max: 37 (22 @ 14:04)  HR: 93 (22 @ 05:58) (82 - 95)  BP: 109/63 (22 @ 05:58) (109/63 - 116/65)  RR: 18 (22 @ 05:58) (16 - 18)  SpO2: 100% (22 @ 05:58) (100% - 100%)  Wt(kg): --                        9.5    17.79 )-----------( 165      ( 29 Mar 2022 07:34 )             29.1           Blood Type: O Positive    RPR: Negative          MEDICATIONS  (STANDING):  acetaminophen     Tablet .. 975 milliGRAM(s) Oral <User Schedule>  diphtheria/tetanus/pertussis (acellular) Vaccine (ADAcel) 0.5 milliLiter(s) IntraMuscular once  heparin   Injectable 5000 Unit(s) SubCutaneous every 12 hours  ibuprofen  Tablet. 600 milliGRAM(s) Oral every 6 hours  levothyroxine 125 MICROGram(s) Oral daily    MEDICATIONS  (PRN):  diphenhydrAMINE 25 milliGRAM(s) Oral every 6 hours PRN Pruritus  lanolin Ointment 1 Application(s) Topical every 6 hours PRN Sore Nipples  magnesium hydroxide Suspension 30 milliLiter(s) Oral two times a day PRN Constipation  oxyCODONE    IR 5 milliGRAM(s) Oral every 3 hours PRN Moderate to Severe Pain (4-10)  oxyCODONE    IR 5 milliGRAM(s) Oral once PRN Moderate to Severe Pain (4-10)  simethicone 80 milliGRAM(s) Chew every 4 hours PRN Gas        ASSESSMENT:    27y     G   2   P    2002     PO Day#  2        Delivery: Primary [  ]    Repeat [ X ]       QBL - 605,   Atony ---> s/p TXA and Methergie                                  Indication of procedure: Previous C/S in Labor, Failed TOLAC, Abnormal Fetal Status    Condition: Stable    Past Medical History significant for: HPI:  26 y/o  @ 39.5 wks gestation presents with c/o uterine contractions every 5-10 minutes since 2200 denies any LOF or VB reports +FM denies any n/v/d denies any fever or chills ap care uncomplicated thus far   scheduled for repeat  3/29/2022 @ 1330  PST 3/15/2022   (28 Mar 2022 09:34)      Current Issues:    Breasts:  Soft [x  ]   Engorged [  ]  Nipples:  Abdomen: Soft [ x ]   Distended [  ] Nontender [  ]     Bowel sounds :  Present [  ]  Absent [  ]   Fundus:  Firm [x  ]  Boggy [  ]    Abdominal incision: Clean, Dry and Intact [x  ]  Staples [  ] Steri Strips [  ] Dermabond [ X ] Sutures [  ]    Patient wearing abdominal binder for support.    Vaginal: Lochia:  Heavy [  ]  Moderate [ x ]   Scant [  ]    Extremities: Edema [  ] Negative Nelida's Sign [ X ] Nontender Anatoliy  [ x ] Positive pedal pulses [  ]    Other relevant physical exam findings:  S/P N & V yesterday, now resolved.      PLAN:    Plan: Increase ambulation, analgesia PRN and pain medication protocol standing oxycodone, ibuprofen and acetaminophen.    Diet: Regular diet    Continue routine post-operative and postpartum care.     Discharge Planning [ x ]    For discharge Today  [  X  ]    Consults:  Social Work [  ]  Lactation [ x ]  Other [         ]    SUBJECTIVE:    Pain: Controlled    Complaints: C/O gas pains    MILESTONES:    Alert and Oriented x 3  [ x ]  Out of bed/ ambulating. [ x ]  Flatus:   Positive [ x ]  Negative [  ]  Bowel movement  [  ] Positive [  ] Negative   Voiding [x  ] Due to void [  ]   Pavon/Indwelling catheter in place [  ]  Diet: Regular [ x ]  Clears [  ]  NPO [  ]    Infant feeding:  Breast [  ]   Bottle [  ]  Both [ X ]  Feeding related issues and/or concerns:      OBJECTIVE:  T(C): 36.8 (22 @ 05:58), Max: 37 (22 @ 14:04)  HR: 93 (22 @ 05:58) (82 - 95)  BP: 109/63 (22 @ 05:58) (109/63 - 116/65)  RR: 18 (22 @ 05:58) (16 - 18)  SpO2: 100% (22 @ 05:58) (100% - 100%)  Wt(kg): --                        9.5    17.79 )-----------( 165      ( 29 Mar 2022 07:34 )             29.1           Blood Type: O Positive    RPR: Negative          MEDICATIONS  (STANDING):  acetaminophen     Tablet .. 975 milliGRAM(s) Oral <User Schedule>  diphtheria/tetanus/pertussis (acellular) Vaccine (ADAcel) 0.5 milliLiter(s) IntraMuscular once  heparin   Injectable 5000 Unit(s) SubCutaneous every 12 hours  ibuprofen  Tablet. 600 milliGRAM(s) Oral every 6 hours  levothyroxine 125 MICROGram(s) Oral daily    MEDICATIONS  (PRN):  diphenhydrAMINE 25 milliGRAM(s) Oral every 6 hours PRN Pruritus  lanolin Ointment 1 Application(s) Topical every 6 hours PRN Sore Nipples  magnesium hydroxide Suspension 30 milliLiter(s) Oral two times a day PRN Constipation  oxyCODONE    IR 5 milliGRAM(s) Oral every 3 hours PRN Moderate to Severe Pain (4-10)  oxyCODONE    IR 5 milliGRAM(s) Oral once PRN Moderate to Severe Pain (4-10)  simethicone 80 milliGRAM(s) Chew every 4 hours PRN Gas        ASSESSMENT:    27y     G   2   P    2002     PO Day#  2        Delivery: Primary [  ]    Repeat [ X ]       QBL - 605,   Atony ---> s/p TXA and Methergie                                  Indication of procedure: Previous C/S in Labor, Failed TOLAC, Abnormal Fetal Status    Condition: Stable    Past Medical History significant for: HPI:  28 y/o  @ 39.5 wks gestation presents with c/o uterine contractions every 5-10 minutes since 2200 denies any LOF or VB reports +FM denies any n/v/d denies any fever or chills ap care uncomplicated thus far   scheduled for repeat  3/29/2022 @ 1330  PST 3/15/2022   (28 Mar 2022 09:34)      Current Issues:    Breasts:  Soft [x  ]   Engorged [  ]  Nipples:  Abdomen: Soft [ x ]   Distended [  ] Nontender [  ]     Bowel sounds :  Present [  ]  Absent [  ]   Fundus:  Firm [x  ]  Boggy [  ]    Abdominal incision: Clean, Dry and Intact [x  ]  Staples [  ] Steri Strips [  ] Dermabond [ X ] Sutures [  ]    Patient wearing abdominal binder for support.    Vaginal: Lochia:  Heavy [  ]  Moderate [ x ]   Scant [  ]    Extremities: Edema [  ] Negative Nelida's Sign [ X ] Nontender Anatoliy  [ x ] Positive pedal pulses [  ]    Other relevant physical exam findings:  S/P N & V yesterday, now resolved.      PLAN:    Plan: Increase ambulation, analgesia PRN and pain medication protocol standing oxycodone, ibuprofen and acetaminophen.    Diet: Regular diet    Continue routine post-operative and postpartum care.  Mylicon/MOM/Senna as needed.    Discharge Planning [ x ]    For discharge Today  [  X  ]    Consults:  Social Work [  ]  Lactation [ x ]  Other [         ]

## 2022-03-31 VITALS
TEMPERATURE: 98 F | RESPIRATION RATE: 19 BRPM | SYSTOLIC BLOOD PRESSURE: 112 MMHG | HEART RATE: 82 BPM | OXYGEN SATURATION: 98 % | DIASTOLIC BLOOD PRESSURE: 74 MMHG

## 2022-03-31 RX ADMIN — Medication 975 MILLIGRAM(S): at 11:48

## 2022-03-31 RX ADMIN — Medication 125 MICROGRAM(S): at 04:40

## 2022-03-31 RX ADMIN — Medication 975 MILLIGRAM(S): at 04:40

## 2022-03-31 RX ADMIN — Medication 600 MILLIGRAM(S): at 08:45

## 2022-03-31 RX ADMIN — Medication 600 MILLIGRAM(S): at 00:04

## 2022-03-31 RX ADMIN — SIMETHICONE 80 MILLIGRAM(S): 80 TABLET, CHEWABLE ORAL at 04:40

## 2022-03-31 RX ADMIN — Medication 600 MILLIGRAM(S): at 01:00

## 2022-03-31 RX ADMIN — Medication 600 MILLIGRAM(S): at 08:03

## 2022-03-31 RX ADMIN — Medication 975 MILLIGRAM(S): at 05:30

## 2022-03-31 RX ADMIN — HEPARIN SODIUM 5000 UNIT(S): 5000 INJECTION INTRAVENOUS; SUBCUTANEOUS at 00:04

## 2022-03-31 NOTE — PROGRESS NOTE ADULT - SUBJECTIVE AND OBJECTIVE BOX
SUBJECTIVE:  Pain: well controlled  Complaints: none  MILESTONES:  Alert and oriented x 3  [x  ]  Out of bed/ ambulating. [ x ]  Flatus: [ x ]  Postive [  ] Negative   Bowel movement  [ x ] Positive [  ] Negative   Voiding [ x ] Due to void [  ]   Diet: Regular [ x ]  Clears [  ]  NPO [  ]  Infant feeding:  Breast [ x ]   Bottle [  ]  Both [  ]  Feeding related inssues and/or concerns: none    OBJECTIVE:  T(C): 36.8 (22 @ 06:32), Max: 37.4 (22 @ 14:19)  HR: 77 (22 @ 06:32) (77 - 92)  BP: 110/67 (22 @ 06:32) (110/67 - 119/72)  RR: 18 (22 @ 06:32) (18 - 18)  SpO2: 97% (22 @ 06:32) (97% - 100%)  Wt(kg): --      MEDICATIONS  (STANDING):  acetaminophen     Tablet .. 975 milliGRAM(s) Oral <User Schedule>  diphtheria/tetanus/pertussis (acellular) Vaccine (ADAcel) 0.5 milliLiter(s) IntraMuscular once  heparin   Injectable 5000 Unit(s) SubCutaneous every 12 hours  ibuprofen  Tablet. 600 milliGRAM(s) Oral every 6 hours  levothyroxine 125 MICROGram(s) Oral daily    MEDICATIONS  (PRN):  diphenhydrAMINE 25 milliGRAM(s) Oral every 6 hours PRN Pruritus  lanolin Ointment 1 Application(s) Topical every 6 hours PRN Sore Nipples  magnesium hydroxide Suspension 30 milliLiter(s) Oral two times a day PRN Constipation  oxyCODONE    IR 5 milliGRAM(s) Oral every 3 hours PRN Moderate to Severe Pain (4-10)  oxyCODONE    IR 5 milliGRAM(s) Oral once PRN Moderate to Severe Pain (4-10)  senna 1 Tablet(s) Oral two times a day PRN Constipation  simethicone 80 milliGRAM(s) Chew every 4 hours PRN Gas      ASSESSMENT:  27y  y/o G  2 P2    PO Day#  3      Delivery: Primary [  ]    Repeat [ x ]                                       Indication of procedure:  Condition: Stable  Past Medical History significant for: HPI:  28 y/o  @ 39.5 wks gestation presents with c/o uterine contractions every 5-10 minutes since 2200 denies any LOF or VB reports +FM denies any n/v/d denies any fever or chills ap care uncomplicated thus far   scheduled for repeat  3/29/2022 @ 1330  PST 3/15/2022   (28 Mar 2022 09:34)    Current Issues: none  Heart:       RRR                       Lungs: clear  Breasts:  Soft [ x ]   Engorged [  ]  Abdomen: Soft [ x ] , distended [  ] nontender [x  ]   Bowel sounds :  Present [ x ]  Absent [  ]   Fundus firm [ x ]  Boggy [  ]  Abdominal incision: Clean, dry and intact [x  ]  Staples [  ] Steri Strips [  ] Dermabond [  ] Sutures [ x ] ISAAC ( )  Patient wearing abdominal binder for support.  Vaginal: Lochia:  Heavy [  ]  Moderate [  ]   Scant [ x ]  Extremities: Edema [ 0 ] negative Nelida's Sign [ x ] Nontender Anatoliy  [x  ] Positive pedal pulses [ x ]  Other relevant physical exam findings: none      PLAN:  Plan: Increase ambulation, analgesia PRN and pain medication protocol standing oxycodone, ibuprofen and acetaminophen.  Diet: Regular diet  Continue routine post-operative and postpartum care.   Discharge Planning [x  ]  Consults:   Social Work [  ] Lacation [  ] Other [  ]

## 2022-06-02 LAB — SURGICAL PATHOLOGY STUDY: SIGNIFICANT CHANGE UP

## 2022-11-02 NOTE — OB RN TRIAGE NOTE - NS_FETALMOVEMENT_OBGYN_ALL_OB
Phone call from patient's spouse, Juliann.  ( verbal ok in epic )  She already picked up work excuse letter at E[employer] .    Present, unchanged

## 2023-01-13 NOTE — DISCHARGE NOTE OB - SEVERE ABDOMINAL/VAGINAL AND/OR RECTAL PAIN
Chief Complaint   Patient presents with    New Patient     referred by pcp for left sided weakness - had stroke the end of february 1. Have you been to the ER, urgent care clinic since your last visit? Hospitalized since your last visit? No     2. Have you seen or consulted any other health care providers outside of the [address] [place] [address] since your last visit? Include any pap smears or colon screening.   No Clinical documentation indicates that this patient has a history of atrial fibrillation.  Could you please further clarify the AFIB type    -Chronic:  -Permanent:    -Persistent:   -Other:  -Unable to rule out Chronic AFIB    SUPPORTING DOCUMENTATION AND/OR CLINICAL EVIDENCE:    -ECG (01.08.23 @ 11:51) >Diagnosis Line Atrial fibrillationMinimal voltage criteria for LVH, may be normal variant ( Nino product )  -ECG (01.06.23 @ 18:31) >Diagnosis Line Atrial flutter with variable AV block  -ECG (08.03.21 @ 08:02) >Diagnosis Line Atrial fibrillationIncomplete right bundle branch block      -Cardiology Consult:Consult- Cardiology:  Reason for Consult: Hx HTN, Afib on Eliquis c/p CP, palpaitations past few weeks    -Medications (Cardiovascular):Eliquis   	  -DC summary-Subdural hematoma-Hypertension-1/8- Patient seen and examined. BP still slightly elevated up to 160s. Endorsed CP and pressure earlier today was seen by Cardiology ordered EKG. Patient reports CP resolved after 10 min,     -Cardiology-Problem/Plan - 2:·  Problem: Atrial fibrillation. ·  Plan: Hold AC for now. Rate controlled.Echo - preliminary reading - normal LV function, no acute findings   cont. coreg - dose increased and digoxin-Problem/Recommendation - 2:·  Problem: Atrial fibrillation. ·  Recommendation: Continue digoxin and Coreg. Continue to suspend A/C until cleared by     -Critical Care-1/8-[facility] Katie QUILES.fib on apixaban -She has been experiencing dizziness and headache for a few weeks and suffered around [place] time. She lowered and stopped her clonidine as she thought that was the cause of her dizziness. 	  H&P-Pt is neurologically intact and BP well controlled. Pt has a hx of afib ob Eliquis and HYN with many drug allergies. Clinical documentation indicates that this patient has a history of atrial fibrillation.  Could you please further clarify the AFIB type. Critical care documents pAF but not clear if Persistent, Permanent or Paroxysmal since patient has last3 EKS 2023 and 2021 all readings AFIB    -Chronic:  -Permanent:    -Persistent:   -Other:  -Unable to rule out Chronic AFIB    SUPPORTING DOCUMENTATION AND/OR CLINICAL EVIDENCE:    -ECG (01.08.23 @ 11:51) >Diagnosis Line Atrial fibrillationMinimal voltage criteria for LVH, may be normal variant ( Nino product )  -ECG (01.06.23 @ 18:31) >Diagnosis Line Atrial flutter with variable AV block  -ECG (08.03.21 @ 08:02) >Diagnosis Line Atrial fibrillationIncomplete right bundle branch block      -Cardiology Consult:Consult- Cardiology:  Reason for Consult: Hx HTN, Afib on Eliquis c/p CP, palpaitations past few weeks    -Medications (Cardiovascular):Eliquis   	  -DC summary-Subdural hematoma-Hypertension-1/8- Patient seen and examined. BP still slightly elevated up to 160s. Endorsed CP and pressure earlier today was seen by Cardiology ordered EKG. Patient reports CP resolved after 10 min,     -Cardiology-Problem/Plan - 2:·  Problem: Atrial fibrillation. ·  Plan: Hold AC for now. Rate controlled.Echo - preliminary reading - normal LV function, no acute findings   cont. coreg - dose increased and digoxin-Problem/Recommendation - 2:·  Problem: Atrial fibrillation. ·  Recommendation: Continue digoxin and Coreg. Continue to suspend A/C until cleared by     -Critical Care-1/8-[facility] HTN, pA.fib on apixaban -She has been experiencing dizziness and headache for a few weeks and suffered around [place] time. She lowered and stopped her clonidine as she thought that was the cause of her dizziness. 	  H&P-Pt is neurologically intact and BP well controlled. Pt has a hx of afib ob Eliquis and HYN with many drug allergies. Statement Selected

## 2023-05-25 NOTE — OB RN PATIENT PROFILE - EXTENSIONS OF SELF_ADULT
What Type Of Note Output Would You Prefer (Optional)?: Standard Output Is The Patient Presenting As Previously Scheduled?: Yes How Severe Is Your Rash?: moderate Is This A New Presentation, Or A Follow-Up?: Rash Additional History: Patient was in contact with poison ivy two weeks ago. Patient is red, itchy and blistering. She has been using TAC. 1% and Mupirocin ointment. None

## 2024-04-01 PROBLEM — E03.9 HYPOTHYROIDISM, UNSPECIFIED: Chronic | Status: ACTIVE | Noted: 2022-03-15

## 2024-04-26 PROBLEM — Z00.00 ENCOUNTER FOR PREVENTIVE HEALTH EXAMINATION: Status: ACTIVE | Noted: 2024-04-26

## 2024-04-29 ENCOUNTER — APPOINTMENT (OUTPATIENT)
Dept: PULMONOLOGY | Facility: CLINIC | Age: 29
End: 2024-04-29
Payer: MEDICAID

## 2024-04-29 VITALS
TEMPERATURE: 97.6 F | HEIGHT: 62 IN | HEART RATE: 80 BPM | DIASTOLIC BLOOD PRESSURE: 78 MMHG | BODY MASS INDEX: 28.89 KG/M2 | WEIGHT: 157 LBS | SYSTOLIC BLOOD PRESSURE: 116 MMHG | OXYGEN SATURATION: 98 %

## 2024-04-29 DIAGNOSIS — Z83.3 FAMILY HISTORY OF DIABETES MELLITUS: ICD-10-CM

## 2024-04-29 DIAGNOSIS — Z86.39 PERSONAL HISTORY OF OTHER ENDOCRINE, NUTRITIONAL AND METABOLIC DISEASE: ICD-10-CM

## 2024-04-29 DIAGNOSIS — R06.02 SHORTNESS OF BREATH: ICD-10-CM

## 2024-04-29 DIAGNOSIS — R05.9 COUGH, UNSPECIFIED: ICD-10-CM

## 2024-04-29 DIAGNOSIS — R06.2 WHEEZING: ICD-10-CM

## 2024-04-29 PROCEDURE — 99203 OFFICE O/P NEW LOW 30 MIN: CPT

## 2024-04-29 RX ORDER — FLUTICASONE FUROATE AND VILANTEROL TRIFENATATE 100; 25 UG/1; UG/1
100-25 POWDER RESPIRATORY (INHALATION)
Qty: 1 | Refills: 2 | Status: ACTIVE | COMMUNITY
Start: 2024-04-29 | End: 1900-01-01

## 2024-04-29 RX ORDER — ALBUTEROL SULFATE 90 UG/1
108 (90 BASE) INHALANT RESPIRATORY (INHALATION)
Qty: 1 | Refills: 3 | Status: ACTIVE | COMMUNITY
Start: 2024-04-29 | End: 1900-01-01

## 2024-04-29 RX ORDER — LEVOTHYROXINE SODIUM 112 UG/1
112 TABLET ORAL
Refills: 0 | Status: ACTIVE | COMMUNITY

## 2024-05-17 PROBLEM — R06.2 WHEEZING: Status: ACTIVE | Noted: 2024-05-17

## 2024-05-17 PROBLEM — R05.9 COUGH: Status: ACTIVE | Noted: 2024-05-17

## 2024-05-17 PROBLEM — R06.02 SHORTNESS OF BREATH: Status: ACTIVE | Noted: 2024-05-17

## 2024-05-17 NOTE — REVIEW OF SYSTEMS
[Negative] : Endocrine [Cough] : cough [Wheezing] : wheezing [SOB on Exertion] : sob on exertion [Thyroid Problem] : thyroid problem

## 2024-05-17 NOTE — ASSESSMENT
[FreeTextEntry1] : Ms. VILLANUEVA is a 29 year woman, never smoker, with PMH hypothyroidism who presents to Pulmonary clinic for evaluation of dyspnea. Pt reports 3 years with exertional shortness of breath. HPI c/f possible moderate persistent asthma, exacerbated by activity/exercise.   Plan: - Start trial of Breo Ellipta 100/25 1 inh once daily, rinsing mouth after use - Start trial of albuterol q4-6h PRN SOB/wheezing - Obtain full PFTs prior to next appt, including lung volume, spirometry, bronchodilator responsiveness, DLCO - Counseled regarding continued avoidance of possible triggers for asthma. Stay indoors/wear KN95 or N95 if outdoors when air quality is poor, use air purifier indoors, change clothes/shower if possible when coming inside from outdoor pollen exposure if noted to have outdoor worsening of sx/seasonal allergies - Return to clinic in 1 month to re-evaluate respiratory sx. Pt knows to call for any interval pulmonary issues or concerns

## 2024-05-17 NOTE — HISTORY OF PRESENT ILLNESS
[Never] : never [TextBox_4] : Ms. VILLANUEVA is a 29 year woman, never smoker, with PMH hypothyroidism who presents to Pulmonary clinic for evaluation of dyspnea. Pt reports 3 years with exertional shortness of breath. After 2-3 mins of exertion will get short breath. More difficulty going up steps. Sx started after birth of her last son. Notices intermittent wheezing as well, associated heavy breathing. No nighttime sx. Sometimes with cough, usually dry in the morning. No allergies. Sometimes has dizziness from seated to standing position. At this time, she denies anginal/pleuritic CP, SOB at rest, stridor, orthopnea, hemoptysis, LE edema, sick contacts, recent travel, history of frequent URIs/LRTIs, recent use of antibiotics/steroids, recent hospitalization, f/c/n/v

## 2024-06-12 ENCOUNTER — APPOINTMENT (OUTPATIENT)
Dept: PULMONOLOGY | Facility: CLINIC | Age: 29
End: 2024-06-12

## (undated) DEVICE — DRSG DERMABOND PRINEO 22CM